# Patient Record
(demographics unavailable — no encounter records)

---

## 2025-06-03 NOTE — DEVELOPMENTAL MILESTONES
[Washes and dries hands] : washes and dries hands  [Brushes teeth with help] : brushes teeth with help [Puts on clothing] : puts on clothing [Plays pretend] : plays pretend  [Plays with other children] : plays with other children [Imitates vertical line] : imitates vertical line [Turns pages of book 1 at a time] : turns pages of book 1 at a time [Throws ball overhead] : throws ball overhead [Jumps up] : jumps up [Kicks ball] : kicks ball [Walks up and down stairs 1 step at a time] : walks up and down stairs 1 step at a time [Speech half understanable] : speech half understandable [Body parts - 6] : body parts - 6 [Combines words] : combines words [Follows 2 step command] : follows 2 step command [Says >20 words] : says >20 words

## 2025-06-03 NOTE — HISTORY OF PRESENT ILLNESS
[FreeTextEntry1] : CHRISTA CONDE is a 2 year old girl who presents for initial evaluation for abnormal hand movements.    Mom presents due to concerns for abnormal hand movements since she was ~10 months old.   Sometimes she will have turn her hands out or hold them partially closed and flexed or extended at the wrists when she is using them.  She can feed herself, hold a pencil/marker, and does not tend to drop objects.  She has a left hand preference.  Grandmother reports decreased use of the RUE.  Parent also thinks that she looks weaker on RLE when walking.  She was previously evaluated by a neurologist in November, who ordered EEG (normal). She complains of bilateral knee pain frequently.  No associated joint swelling or redness.  Rheumatology appointment is pending.  Born FT,  Development is normal.  PMD referred for EI evaluation. Has pending rheumatologist appointment  FHx: MGM has Sjogrens, rheumatoid arthritis, and adrenal insufficiency  FHx 3 yo twin sisters are healthy

## 2025-06-03 NOTE — PHYSICAL EXAM
[Well-appearing] : well-appearing [Normocephalic] : normocephalic [No dysmorphic facial features] : no dysmorphic facial features [No ocular abnormalities] : no ocular abnormalities [Neck supple] : neck supple [Alert] : alert [Well related, good eye contact] : well related, good eye contact [Single words] : single words [Pupils reactive to light] : pupils reactive to light [Turns to light] : turns to light [Tracks face, light or objects with full extraocular movements] : tracks face, light or objects with full extraocular movements [Responds to touch on face] : responds to touch on face [No facial asymmetry or weakness] : no facial asymmetry or weakness [No nystagmus] : no nystagmus [Responds to voice/sounds] : responds to voice/sounds [Good shoulder shrug] : good shoulder shrug [Midline tongue] : midline tongue [No fasciculations] : no fasciculations [L handed] : L handed [Normal axial and appendicular muscle tone with symmetric limb movements] : normal axial and appendicular muscle tone with symmetric limb movements [Normal bulk] : normal bulk [Reaches for toys and or gives high five] : reaches for toys and or gives high five [Good  bilaterally] : good  bilaterally [5/5 strength in proximal and distal muscles of arms and legs] : 5/5 strength in proximal and distal muscles of arms and legs [No abnormal involuntary movements] : no abnormal involuntary movements [Stands alone] : stands alone [Walks well for age] : walks well for age [Running] : running [Negative Gowers] : negative Gowers [Good stoop and recover] : good stoop and recover [2+ biceps] : 2+ biceps [Triceps] : triceps [Knee jerks] : knee jerks [Ankle jerks] : ankle jerks [No ankle clonus] : no ankle clonus [Bilaterally] : bilaterally [Responds to touch and tickle] : responds to touch and tickle [No dysmetria in reaching for objects and or on FTNT] : no dysmetria in reaching for objects and or on FTNT [Good standing and or walking balance for age, no ataxia] : good standing and or walking balance for age, no ataxia [de-identified] : age-appropriate pencil grasp, intact pincer grasp bilaterally [de-identified] : symmetric movements of bilateral UE/LE [de-identified] : symmetric arm swing when walking

## 2025-06-03 NOTE — ASSESSMENT
[FreeTextEntry1] : 3 yo girl referred for evaluation of hand posturing movements and concern for R sided weakness.

## 2025-06-03 NOTE — PHYSICAL EXAM
[Well-appearing] : well-appearing [Normocephalic] : normocephalic [No dysmorphic facial features] : no dysmorphic facial features [No ocular abnormalities] : no ocular abnormalities [Neck supple] : neck supple [Alert] : alert [Well related, good eye contact] : well related, good eye contact [Single words] : single words [Pupils reactive to light] : pupils reactive to light [Turns to light] : turns to light [Tracks face, light or objects with full extraocular movements] : tracks face, light or objects with full extraocular movements [Responds to touch on face] : responds to touch on face [No facial asymmetry or weakness] : no facial asymmetry or weakness [No nystagmus] : no nystagmus [Responds to voice/sounds] : responds to voice/sounds [Good shoulder shrug] : good shoulder shrug [Midline tongue] : midline tongue [No fasciculations] : no fasciculations [L handed] : L handed [Normal axial and appendicular muscle tone with symmetric limb movements] : normal axial and appendicular muscle tone with symmetric limb movements [Normal bulk] : normal bulk [Reaches for toys and or gives high five] : reaches for toys and or gives high five [Good  bilaterally] : good  bilaterally [5/5 strength in proximal and distal muscles of arms and legs] : 5/5 strength in proximal and distal muscles of arms and legs [No abnormal involuntary movements] : no abnormal involuntary movements [Stands alone] : stands alone [Walks well for age] : walks well for age [Running] : running [Negative Gowers] : negative Gowers [Good stoop and recover] : good stoop and recover [2+ biceps] : 2+ biceps [Triceps] : triceps [Knee jerks] : knee jerks [Ankle jerks] : ankle jerks [No ankle clonus] : no ankle clonus [Bilaterally] : bilaterally [Responds to touch and tickle] : responds to touch and tickle [No dysmetria in reaching for objects and or on FTNT] : no dysmetria in reaching for objects and or on FTNT [Good standing and or walking balance for age, no ataxia] : good standing and or walking balance for age, no ataxia [de-identified] : age-appropriate pencil grasp, intact pincer grasp bilaterally [de-identified] : symmetric movements of bilateral UE/LE [de-identified] : symmetric arm swing when walking

## 2025-06-03 NOTE — ASSESSMENT
[FreeTextEntry1] : 1 yo girl referred for evaluation of hand posturing movements and concern for R sided weakness.

## 2025-06-03 NOTE — PLAN
[FreeTextEntry1] : Exam was normal today I did not appreciate any weakness on exam.  Handedness at this age is not considered pathologic.  However, given parental concern and possibility of missing subtle weakness, we discussed the option of neuroimaging.  Given the need for anesthesia, mom deferred testing for now.  She is in process of getting EI evaluation and will follow up with me in 6 months to reassess for any new or worsening symptoms. Normal head size today- if there is a progressive head deceleration we could consider further genetic testing at next appointment (e.g. r/o Rett syndrome) but will defer for now given normal development

## 2025-06-03 NOTE — REASON FOR VISIT
[Initial Consultation] : an initial consultation for [Family Member] : family member [Mother] : mother [Other: _____] : [unfilled]

## 2025-07-14 NOTE — IMMUNIZATIONS
[Immunizations are up to date] : Immunizations are up to date [Records maintained by [facility]] : Records maintained by [facility]

## 2025-07-14 NOTE — REASON FOR VISIT
[Follow-Up: _____] : [unfilled] is  being seen for a [unfilled] follow-up visit [Mother] : mother [Family Member] : family member [Medical Records] : medical records

## 2025-07-15 NOTE — REVIEW OF SYSTEMS
[NI] : Endocrine [Nl] : Hematologic/Lymphatic [Rash] : rash [Joint Pains] : arthralgias [Joint Swelling] : joint swelling  [AM Stiffness] : am stiffness [Skin Lesions] : no skin lesions

## 2025-07-15 NOTE — PHYSICAL EXAM
[Palate] : normal palate [S1, S2 Present] : S1, S2 present [Cardiac Auscultation] : normal cardiac auscultation  [Peripheral Pulses] : positive peripheral pulses [Respiratory Effort] : normal respiratory effort [Clear to auscultation] : clear to auscultation [Soft] : soft [NonTender] : non tender [Non Distended] : non distended [Normal Bowel Sounds] : normal bowel sounds [No Hepatosplenomegaly] : no hepatosplenomegaly [No Abnormal Lymph Nodes Palpated] : no abnormal lymph nodes palpated [Range Of Motion] : full range of motion [Gait] : normal gait [Not Examined] : not examined [PERRLA] : MANOLO [Acute distress] : no acute distress [Rash] : no rash [Malar Erythema] : no malar erythema [Erythematous Conjunctiva] : nonerythematous conjunctiva [Erythematous Oropharynx] : nonerythematous oropharynx [Ulcers] : no ulcers [Lesions] : no lesions [Murmurs] : no murmurs [Peripheral Edema] : no peripheral edema  [Joint effusions] : no joint effusions [de-identified] : no objective arthritis on exam, walks appropriately for age  [de-identified] : equal [de-identified] : none - calf circumference equal

## 2025-07-15 NOTE — PHYSICAL EXAM
[Palate] : normal palate [S1, S2 Present] : S1, S2 present [Cardiac Auscultation] : normal cardiac auscultation  [Peripheral Pulses] : positive peripheral pulses [Respiratory Effort] : normal respiratory effort [Clear to auscultation] : clear to auscultation [Soft] : soft [NonTender] : non tender [Non Distended] : non distended [Normal Bowel Sounds] : normal bowel sounds [No Hepatosplenomegaly] : no hepatosplenomegaly [No Abnormal Lymph Nodes Palpated] : no abnormal lymph nodes palpated [Range Of Motion] : full range of motion [Gait] : normal gait [Not Examined] : not examined [PERRLA] : MANOLO [Acute distress] : no acute distress [Rash] : no rash [Malar Erythema] : no malar erythema [Erythematous Conjunctiva] : nonerythematous conjunctiva [Erythematous Oropharynx] : nonerythematous oropharynx [Ulcers] : no ulcers [Lesions] : no lesions [Murmurs] : no murmurs [Peripheral Edema] : no peripheral edema  [Joint effusions] : no joint effusions [de-identified] : no objective arthritis on exam, walks appropriately for age  [de-identified] : equal [de-identified] : none - calf circumference equal

## 2025-07-15 NOTE — CONSULT LETTER
[Dear  ___] : Dear  [unfilled], [Consult Letter:] : I had the pleasure of evaluating your patient, [unfilled]. [Please see my note below.] : Please see my note below. [Consult Closing:] : Thank you very much for allowing me to participate in the care of this patient.  If you have any questions, please do not hesitate to contact me. [Sincerely,] : Sincerely, [FreeTextEntry2] : Dr. Emily Romeo [address] [place] [address], [id number] [FreeTextEntry3] : MD Kennedy Conklin [facility]  Pediatric Rheumatology Fellow

## 2025-07-15 NOTE — HISTORY OF PRESENT ILLNESS
[Rheumatoid Arthritis] : Rheumatoid Arthritis [Raynaud's Disease] : Raynaud's Disease [Unlimited ADLs] : able to do activities of daily living without limitations [FreeTextEntry1] : Since last seen: Called by mom on 6/20 because patient had swelling of R arm, wrist and left leg swelling. Took motrin and improved within 4 days. US not done yet because no one that they have called/been to in the [employer] system can US her because she is 1yo Complaining of hand pain and squeezing her hands  Had swelling of her knee and hand - limps with knee pain/swelling.  R arm, R knee (mid to end June) pain/swelling R arm, L knee (7/9) - resolved within a couple of days  Doing everything ok  Morning stiffness maybe - moves slower in the AM, maybe stiff  Getting motrin at night for pain because without it wakes up at 3-4am in pain

## 2025-07-15 NOTE — CONSULT LETTER
[Dear  ___] : Dear  [unfilled], [Consult Letter:] : I had the pleasure of evaluating your patient, [unfilled]. [Please see my note below.] : Please see my note below. [Consult Closing:] : Thank you very much for allowing me to participate in the care of this patient.  If you have any questions, please do not hesitate to contact me. [Sincerely,] : Sincerely, [FreeTextEntry2] : Dr. Emily Romeo [address] [place] [address], [phone] [FreeTextEntry3] : MD Kennedy Conklin [facility]  Pediatric Rheumatology Fellow

## 2025-07-15 NOTE — HISTORY OF PRESENT ILLNESS
[Rheumatoid Arthritis] : Rheumatoid Arthritis [Raynaud's Disease] : Raynaud's Disease [Unlimited ADLs] : able to do activities of daily living without limitations [FreeTextEntry1] : Since last seen: Called by mom on 6/20 because patient had swelling of R arm, wrist and left leg swelling. Took motrin and improved within 4 days. US not done yet because no one that they have called/been to in the "[employer] system can US her because she is 3yo Complaining of hand pain and squeezing her hands  Had swelling of her knee and hand - limps with knee pain/swelling.  R arm, R knee (mid to end June) pain/swelling R arm, L knee (7/9) - resolved within a couple of days  Doing everything ok  Morning stiffness maybe - moves slower in the AM, maybe stiff  Getting motrin at night for pain because without it wakes up at 3-4am in pain